# Patient Record
Sex: MALE | Race: BLACK OR AFRICAN AMERICAN | NOT HISPANIC OR LATINO | URBAN - METROPOLITAN AREA
[De-identification: names, ages, dates, MRNs, and addresses within clinical notes are randomized per-mention and may not be internally consistent; named-entity substitution may affect disease eponyms.]

---

## 2017-05-28 ENCOUNTER — EMERGENCY (EMERGENCY)
Facility: HOSPITAL | Age: 77
LOS: 1 days | Discharge: HOME | End: 2017-05-28
Attending: EMERGENCY MEDICINE | Admitting: EMERGENCY MEDICINE
Payer: MEDICARE

## 2017-05-28 VITALS
RESPIRATION RATE: 20 BRPM | DIASTOLIC BLOOD PRESSURE: 69 MMHG | WEIGHT: 175.05 LBS | TEMPERATURE: 98 F | HEIGHT: 68 IN | HEART RATE: 104 BPM | SYSTOLIC BLOOD PRESSURE: 143 MMHG | OXYGEN SATURATION: 98 %

## 2017-05-28 VITALS
OXYGEN SATURATION: 100 % | HEART RATE: 95 BPM | RESPIRATION RATE: 18 BRPM | DIASTOLIC BLOOD PRESSURE: 73 MMHG | SYSTOLIC BLOOD PRESSURE: 134 MMHG

## 2017-05-28 DIAGNOSIS — Z96.649 PRESENCE OF UNSPECIFIED ARTIFICIAL HIP JOINT: Chronic | ICD-10-CM

## 2017-05-28 LAB
ALBUMIN SERPL ELPH-MCNC: 3.9 G/DL — SIGNIFICANT CHANGE UP (ref 3.3–5)
ALP SERPL-CCNC: 98 U/L — SIGNIFICANT CHANGE UP (ref 40–120)
ALT FLD-CCNC: 23 U/L — SIGNIFICANT CHANGE UP (ref 4–41)
APPEARANCE UR: CLEAR — SIGNIFICANT CHANGE UP
AST SERPL-CCNC: 83 U/L — HIGH (ref 4–40)
BASOPHILS # BLD AUTO: 0.02 K/UL — SIGNIFICANT CHANGE UP (ref 0–0.2)
BASOPHILS NFR BLD AUTO: 0.3 % — SIGNIFICANT CHANGE UP (ref 0–2)
BILIRUB SERPL-MCNC: 0.4 MG/DL — SIGNIFICANT CHANGE UP (ref 0.2–1.2)
BILIRUB UR-MCNC: NEGATIVE — SIGNIFICANT CHANGE UP
BLOOD UR QL VISUAL: NEGATIVE — SIGNIFICANT CHANGE UP
BUN SERPL-MCNC: 19 MG/DL — SIGNIFICANT CHANGE UP (ref 7–23)
CALCIUM SERPL-MCNC: 9.1 MG/DL — SIGNIFICANT CHANGE UP (ref 8.4–10.5)
CHLORIDE SERPL-SCNC: 97 MMOL/L — LOW (ref 98–107)
CO2 SERPL-SCNC: 24 MMOL/L — SIGNIFICANT CHANGE UP (ref 22–31)
COLOR SPEC: YELLOW — SIGNIFICANT CHANGE UP
CREAT SERPL-MCNC: 0.87 MG/DL — SIGNIFICANT CHANGE UP (ref 0.5–1.3)
EOSINOPHIL # BLD AUTO: 0.02 K/UL — SIGNIFICANT CHANGE UP (ref 0–0.5)
EOSINOPHIL NFR BLD AUTO: 0.3 % — SIGNIFICANT CHANGE UP (ref 0–6)
GLUCOSE SERPL-MCNC: 199 MG/DL — HIGH (ref 70–99)
GLUCOSE UR-MCNC: NEGATIVE — SIGNIFICANT CHANGE UP
HCT VFR BLD CALC: 35.6 % — LOW (ref 39–50)
HGB BLD-MCNC: 11.9 G/DL — LOW (ref 13–17)
IMM GRANULOCYTES NFR BLD AUTO: 0.3 % — SIGNIFICANT CHANGE UP (ref 0–1.5)
KETONES UR-MCNC: NEGATIVE — SIGNIFICANT CHANGE UP
LEUKOCYTE ESTERASE UR-ACNC: NEGATIVE — SIGNIFICANT CHANGE UP
LYMPHOCYTES # BLD AUTO: 1.18 K/UL — SIGNIFICANT CHANGE UP (ref 1–3.3)
LYMPHOCYTES # BLD AUTO: 16.9 % — SIGNIFICANT CHANGE UP (ref 13–44)
MCHC RBC-ENTMCNC: 30 PG — SIGNIFICANT CHANGE UP (ref 27–34)
MCHC RBC-ENTMCNC: 33.4 % — SIGNIFICANT CHANGE UP (ref 32–36)
MCV RBC AUTO: 89.7 FL — SIGNIFICANT CHANGE UP (ref 80–100)
MONOCYTES # BLD AUTO: 0.42 K/UL — SIGNIFICANT CHANGE UP (ref 0–0.9)
MONOCYTES NFR BLD AUTO: 6 % — SIGNIFICANT CHANGE UP (ref 2–14)
NEUTROPHILS # BLD AUTO: 5.32 K/UL — SIGNIFICANT CHANGE UP (ref 1.8–7.4)
NEUTROPHILS NFR BLD AUTO: 76.2 % — SIGNIFICANT CHANGE UP (ref 43–77)
NITRITE UR-MCNC: NEGATIVE — SIGNIFICANT CHANGE UP
PH UR: 6 — SIGNIFICANT CHANGE UP (ref 4.6–8)
PLATELET # BLD AUTO: 202 K/UL — SIGNIFICANT CHANGE UP (ref 150–400)
PMV BLD: 11 FL — SIGNIFICANT CHANGE UP (ref 7–13)
POTASSIUM SERPL-MCNC: 5.1 MMOL/L — SIGNIFICANT CHANGE UP (ref 3.5–5.3)
POTASSIUM SERPL-MCNC: SIGNIFICANT CHANGE UP MMOL/L (ref 3.5–5.3)
POTASSIUM SERPL-SCNC: 5.1 MMOL/L — SIGNIFICANT CHANGE UP (ref 3.5–5.3)
POTASSIUM SERPL-SCNC: SIGNIFICANT CHANGE UP MMOL/L (ref 3.5–5.3)
PROT SERPL-MCNC: 8.2 G/DL — SIGNIFICANT CHANGE UP (ref 6–8.3)
PROT UR-MCNC: 10 — SIGNIFICANT CHANGE UP
RBC # BLD: 3.97 M/UL — LOW (ref 4.2–5.8)
RBC # FLD: 13 % — SIGNIFICANT CHANGE UP (ref 10.3–14.5)
RBC CASTS # UR COMP ASSIST: SIGNIFICANT CHANGE UP (ref 0–?)
SODIUM SERPL-SCNC: 134 MMOL/L — LOW (ref 135–145)
SP GR SPEC: 1.02 — SIGNIFICANT CHANGE UP (ref 1–1.03)
UROBILINOGEN FLD QL: NORMAL E.U. — SIGNIFICANT CHANGE UP (ref 0.1–0.2)
WBC # BLD: 6.98 K/UL — SIGNIFICANT CHANGE UP (ref 3.8–10.5)
WBC # FLD AUTO: 6.98 K/UL — SIGNIFICANT CHANGE UP (ref 3.8–10.5)
WBC UR QL: SIGNIFICANT CHANGE UP (ref 0–?)

## 2017-05-28 PROCEDURE — 99285 EMERGENCY DEPT VISIT HI MDM: CPT | Mod: 25,GC

## 2017-05-28 PROCEDURE — 72192 CT PELVIS W/O DYE: CPT | Mod: 26

## 2017-05-28 PROCEDURE — 73502 X-RAY EXAM HIP UNI 2-3 VIEWS: CPT | Mod: 26,LT

## 2017-05-28 PROCEDURE — 73700 CT LOWER EXTREMITY W/O DYE: CPT | Mod: 26,LT

## 2017-05-28 PROCEDURE — 71010: CPT | Mod: 26

## 2017-05-28 RX ORDER — OXYCODONE HYDROCHLORIDE 5 MG/1
1 TABLET ORAL
Qty: 12 | Refills: 0 | OUTPATIENT
Start: 2017-05-28 | End: 2017-05-31

## 2017-05-28 RX ORDER — TETANUS TOXOID, REDUCED DIPHTHERIA TOXOID AND ACELLULAR PERTUSSIS VACCINE, ADSORBED 5; 2.5; 8; 8; 2.5 [IU]/.5ML; [IU]/.5ML; UG/.5ML; UG/.5ML; UG/.5ML
0.5 SUSPENSION INTRAMUSCULAR ONCE
Qty: 0 | Refills: 0 | Status: COMPLETED | OUTPATIENT
Start: 2017-05-28 | End: 2017-05-28

## 2017-05-28 RX ADMIN — TETANUS TOXOID, REDUCED DIPHTHERIA TOXOID AND ACELLULAR PERTUSSIS VACCINE, ADSORBED 0.5 MILLILITER(S): 5; 2.5; 8; 8; 2.5 SUSPENSION INTRAMUSCULAR at 07:41

## 2017-05-28 NOTE — ED PROVIDER NOTE - CARDIAC, MLM
Normal rate, regular rhythm.  Heart sounds S1, S2.  No murmurs, rubs or gallops.  Mild L chest wall ttp

## 2017-05-28 NOTE — ED ADULT NURSE NOTE - OBJECTIVE STATEMENT
patient arrives to room 23 a*ox3 s/p mechanical fall five hours ago. patient states he slipped on wet grass, and landed on left side of body, was ambulatory immediately after fall,  pt states he then got in the car and traveled from [place] to [place], upon arrival patient had some difficulty exiting the car and difficulty ambulating 2/2 left groin pain. small abrasion noted to left side of face. denies any anticoagulant use, denies head trauma, denies any chest pain or shortness of breath precipitating fall, patient able to move all extremeties without difficulty, no deformities noted. +csm +rom to BLE and BUE. nad noted. patient appears comfortable, in no acute distress. respirations even and unlabored. awaits md fletcher. will continue to monitor patient closely for remainder of stay in emergency dept.

## 2017-05-28 NOTE — ED PROVIDER NOTE - OBJECTIVE STATEMENT
76M p/w fall earlier today, mechanical fall, slipped on grass.  Landed on L hip on the grass, L chest and L face on the concrete.  Fall was in [place], he was able to walk to the car then drove here.  Pt took oxycodone at home due to the pain, started having trouble walking and especially getting out of the car and climbing stairs was particularly difficult.  Pain is mostly in the L groin.  No LOC, no vomiting.  No visual trouble, able to look L, R without pain or double vision.  No neck pain.  USOH today prior to event.  H/o L hip IM nail 6 mos ago, had R hip surgery 7 ya.

## 2017-05-28 NOTE — ED PROVIDER NOTE - ENMT, MLM
Airway patent, Nasal mucosa clear. Mouth with normal mucosa. Throat has no vesicles, no oropharyngeal exudates and uvula is midline.  L face abrasion.  Zygoma nontender, no deformity.  Orbit nontender.

## 2017-05-28 NOTE — ED ADULT TRIAGE NOTE - CHIEF COMPLAINT QUOTE
pt sustained a mechanical fall in [place] about 5 hours ago. C/o left hip pain and left chest wall pain. has abrasion to left orbit. No LOC. has been ambulatory since.

## 2017-05-28 NOTE — ED PROVIDER NOTE - MEDICAL DECISION MAKING DETAILS
76M p/w [facility] fall, landing on L hip, L chest, L face.  No sign or sx of intracranial injury, NEXUS negative.  Will check labs, xrays, pt already took oxycodone prior to coming.  Trial of ambulation.  If having trouble, will check CT pelvis due to prognostication and PT needs.

## 2017-05-28 NOTE — PROVIDER CONTACT NOTE (OTHER) - ASSESSMENT
Pt is a 76-year-old male, who was referred to ED CM regarding hospital bed, Spoke to pt, wife and her dtr at the bedside, process explained. Recommended to contact PMD to order the bed and complete the medical necessity letter for Semi-Electric hospital bed.

## 2017-05-28 NOTE — ED PROVIDER NOTE - MUSCULOSKELETAL, MLM
Spine appears normal, range of motion is not limited, no muscle or joint tenderness.  Mild L groin ttp.  Full ROM L hip.  No ttp L hip.

## 2017-05-28 NOTE — CONSULT NOTE ADULT - SUBJECTIVE AND OBJECTIVE BOX
Orthopaedic Surgery Consult Note    Patient is a 76y old  Male who presents with a chief complaint of L hip pain s/p femur IMN in 2016 with Dr. Guerrero. Patient was doing well until mech fall onto L hip yesterday. Noted mild hip/groin pain and some difficulty bearing weight on affected side afterward. No difficulty ranging the affected hip. Denies headstrike/LOC. No numbness tingling. No other bone/joint complaints.      PAST MEDICAL & SURGICAL HISTORY:  Hypertension  Hypercholesterolemia  Diabetes Mellitus  Diabetes Mellitus  History of Hypertension  Diabetes    [] No significant past history as reviewed with the patient and family    FAMILY HISTORY:  No pertinent family history in first degree relatives    [] Family history not pertinent as reviewed with the patient and family    SOCIAL HISTORY:    MEDICATIONS  (STANDING):  diphtheria/tetanus/pertussis (acellular) Vaccine (ADAcel) 0.5milliLiter(s) IntraMuscular once    MEDICATIONS  (PRN):    Allergies    No Known Allergies    Intolerances        REVIEW OF SYSTEMS  [x] All review of systems negative except for those marked.  Systemic:	[] Fever		[] Chills		[] Night sweats		[] Fatigue	[] Other  [] Cardiovascular:  [] Pulmonary:  [] Renal/Urologic:  [] Gastrointestinal:  [] Metabolic:  [] Neurologic:  [] Hematologic:  [] ENT:  [] Ophthalmologic:  [] Musculoskeletal: see HPI    Vital Signs Last 24 Hrs  T(C): 36.7, Max: 36.7 ( @ 04:26)  T(F): 98, Max: 98 ( @ 04:26)  HR: 97 (97 - 104)  BP: 158/86 (143/69 - 175/67)  BP(mean): --  RR: 16 (16 - 20)  SpO2: 99% (98% - 99%)      PHYSICAL EXAM:  NAD  LLE: skin intact, nttp  Painless FROM  motor intact GS/TA/EHL  SILT s/s/sp/dp  wwp                          11.9   6.98  )-----------( 202      ( 28 May 2017 05:20 )             35.6         134<L>  |  97<L>  |  19  ----------------------------<  199<H>  Test not performed SPECIMEN GROSSLY HEMOLYZED   |  24  |  0.87    Ca    9.1      28 May 2017 05:20    TPro  8.2  /  Alb  3.9  /  TBili  0.4  /  DBili  x   /  AST  83<H>  /  ALT  23  /  AlkPhos  98  05-28      Urinalysis Basic - ( 28 May 2017 05:35 )    Color: YELLOW / Appearance: CLEAR / S.016 / pH: 6.0  Gluc: NEGATIVE / Ketone: NEGATIVE  / Bili: NEGATIVE / Urobili: NORMAL E.U.   Blood: NEGATIVE / Protein: 10 / Nitrite: NEGATIVE   Leuk Esterase: NEGATIVE / RBC: 0-2 / WBC 0-2   Sq Epi: x / Non Sq Epi: x / Bacteria: x        IMAGING STUDIES:  XR L hip: IMN in place, no e/o fx or loosening      76y Male w/ L hip pain s/p mech fall onto left hip s/p femur IMN in 2016  - pain control  - FU CT L hip  - Orthopaedic Surgery Consult Note    Patient is a 76y old  Male who presents with a chief complaint of L hip pain s/p femur IMN in 2016 with Dr. Guerrero. Patient was doing well until mech fall onto L hip yesterday. Noted mild hip/groin pain and some difficulty bearing weight on affected side afterward. No difficulty ranging the affected hip. Denies headstrike/LOC. No numbness tingling. No other bone/joint complaints.      PAST MEDICAL & SURGICAL HISTORY:  Hypertension  Hypercholesterolemia  Diabetes Mellitus  Diabetes Mellitus  History of Hypertension  Diabetes    [] No significant past history as reviewed with the patient and family    FAMILY HISTORY:  No pertinent family history in first degree relatives    [] Family history not pertinent as reviewed with the patient and family    SOCIAL HISTORY:    MEDICATIONS  (STANDING):  diphtheria/tetanus/pertussis (acellular) Vaccine (ADAcel) 0.5milliLiter(s) IntraMuscular once    MEDICATIONS  (PRN):    Allergies    No Known Allergies    Intolerances        REVIEW OF SYSTEMS  [x] All review of systems negative except for those marked.  Systemic:	[] Fever		[] Chills		[] Night sweats		[] Fatigue	[] Other  [] Cardiovascular:  [] Pulmonary:  [] Renal/Urologic:  [] Gastrointestinal:  [] Metabolic:  [] Neurologic:  [] Hematologic:  [] ENT:  [] Ophthalmologic:  [] Musculoskeletal: see HPI    Vital Signs Last 24 Hrs  T(C): 36.7, Max: 36.7 ( @ 04:26)  T(F): 98, Max: 98 ( @ 04:26)  HR: 97 (97 - 104)  BP: 158/86 (143/69 - 175/67)  BP(mean): --  RR: 16 (16 - 20)  SpO2: 99% (98% - 99%)      PHYSICAL EXAM:  NAD  LLE: skin intact, nttp  Painless FROM  motor intact GS/TA/EHL  SILT s/s/sp/dp  wwp                          11.9   6.98  )-----------( 202      ( 28 May 2017 05:20 )             35.6         134<L>  |  97<L>  |  19  ----------------------------<  199<H>  Test not performed SPECIMEN GROSSLY HEMOLYZED   |  24  |  0.87    Ca    9.1      28 May 2017 05:20    TPro  8.2  /  Alb  3.9  /  TBili  0.4  /  DBili  x   /  AST  83<H>  /  ALT  23  /  AlkPhos  98  05-28      Urinalysis Basic - ( 28 May 2017 05:35 )    Color: YELLOW / Appearance: CLEAR / S.016 / pH: 6.0  Gluc: NEGATIVE / Ketone: NEGATIVE  / Bili: NEGATIVE / Urobili: NORMAL E.U.   Blood: NEGATIVE / Protein: 10 / Nitrite: NEGATIVE   Leuk Esterase: NEGATIVE / RBC: 0-2 / WBC 0-2   Sq Epi: x / Non Sq Epi: x / Bacteria: x        IMAGING STUDIES:  XR L hip: IMN in place, no e/o fx or loosening      76y Male w/ L hip pain s/p mech fall onto left hip s/p femur IMN in 2016  - pain control  - FU CT L hip official read  - WBAT with assistive devices as need  - FU with Dr. Guerrero as scheduled  - No acute orthopedic intervention at this time

## 2017-05-28 NOTE — PROVIDER CONTACT NOTE (OTHER) - ACTION/TREATMENT ORDERED:
Sample of a medical necessity letter for Semi-Electric hospital bed and list of  DME companies to supply the bed provided.

## 2017-05-28 NOTE — ED PROVIDER NOTE - PROGRESS NOTE DETAILS
Spoke with Nirali, , [phone] as family feels they can care for patient at home but had questions about services for home care or transportation. Awaiting for CT read. DL PGY4 Melly PGY3: Pt seen by CM, stable for d/c home, rx for oxy sent to pharmacy, ready for d/c. d/c instructions provided by Dr. Liriano

## 2022-09-07 ENCOUNTER — EMERGENCY (EMERGENCY)
Facility: HOSPITAL | Age: 82
LOS: 1 days | Discharge: HOME | End: 2022-09-07
Attending: EMERGENCY MEDICINE | Admitting: EMERGENCY MEDICINE

## 2022-09-07 VITALS
DIASTOLIC BLOOD PRESSURE: 77 MMHG | RESPIRATION RATE: 18 BRPM | SYSTOLIC BLOOD PRESSURE: 181 MMHG | OXYGEN SATURATION: 100 % | TEMPERATURE: 98 F | HEART RATE: 94 BPM | HEIGHT: 68 IN

## 2022-09-07 DIAGNOSIS — Z96.649 PRESENCE OF UNSPECIFIED ARTIFICIAL HIP JOINT: Chronic | ICD-10-CM

## 2022-09-07 PROCEDURE — 99284 EMERGENCY DEPT VISIT MOD MDM: CPT | Mod: FS

## 2022-09-07 RX ORDER — KETOROLAC TROMETHAMINE 30 MG/ML
15 SYRINGE (ML) INJECTION ONCE
Refills: 0 | Status: DISCONTINUED | OUTPATIENT
Start: 2022-09-07 | End: 2022-09-07

## 2022-09-07 RX ORDER — CYCLOBENZAPRINE HYDROCHLORIDE 10 MG/1
1 TABLET, FILM COATED ORAL
Qty: 15 | Refills: 0
Start: 2022-09-07 | End: 2022-09-11

## 2022-09-07 RX ORDER — LIDOCAINE 4 G/100G
1 CREAM TOPICAL
Qty: 5 | Refills: 0
Start: 2022-09-07 | End: 2022-09-11

## 2022-09-07 RX ORDER — IBUPROFEN 200 MG
1 TABLET ORAL
Qty: 15 | Refills: 0
Start: 2022-09-07 | End: 2022-09-11

## 2022-09-07 RX ORDER — CYCLOBENZAPRINE HYDROCHLORIDE 10 MG/1
10 TABLET, FILM COATED ORAL ONCE
Refills: 0 | Status: COMPLETED | OUTPATIENT
Start: 2022-09-07 | End: 2022-09-07

## 2022-09-07 RX ORDER — LIDOCAINE 4 G/100G
1 CREAM TOPICAL ONCE
Refills: 0 | Status: COMPLETED | OUTPATIENT
Start: 2022-09-07 | End: 2022-09-07

## 2022-09-07 RX ADMIN — Medication 15 MILLIGRAM(S): at 15:42

## 2022-09-07 RX ADMIN — CYCLOBENZAPRINE HYDROCHLORIDE 10 MILLIGRAM(S): 10 TABLET, FILM COATED ORAL at 15:43

## 2022-09-07 RX ADMIN — LIDOCAINE 1 PATCH: 4 CREAM TOPICAL at 15:43

## 2022-09-07 RX ADMIN — LIDOCAINE 1 PATCH: 4 CREAM TOPICAL at 15:50

## 2022-09-07 NOTE — ED PROVIDER NOTE - PHYSICAL EXAMINATION
CONSTITUTIONAL: Well-appearing; well-nourished; in no apparent distress. Non-toxic appearing.   NEURO: Alert & oriented. Gait steady without assistance. Sensory and motor functions are grossly intact.  PSYCH: Mood appropriate. Thought processes intact.   NECK: Supple  CARD: Regular rate and rhythm, no murmurs  RESP: No accessory muscle use; breath sounds clear and equal bilaterally; no wheezes, rhonchi, or rales     ABD: Soft; non-distended; non-tender.   MUSCULOSKELETAL/EXTREMITIES: FROM in all four extremities; no extremity edema. tenderness on right paraspinal muscle on L4-5. no deformity/step off/ecchymosis. negative SLR on both sides.   SKIN: Warm; dry; no apparent lesions or exudate

## 2022-09-07 NOTE — ED ADULT NURSE NOTE - OBJECTIVE STATEMENT
Patient is an 82 yo male, h/x HTN, HLD, DM2, presenting with R lower back/hip/leg pain x 2 days. AAOx4, no signs of distress, reports he has had pain in the R hip for a long time but it has been worse over the past 2 days. Taking tylenol at home for pain. Able to walk with cane. Denies chest pain, shortness of breath. Medicated for pain. Fall precautions maintained.

## 2022-09-07 NOTE — ED PROVIDER NOTE - NSICDXPASTMEDICALHX_GEN_ALL_CORE_FT
PAST MEDICAL HISTORY:  Diabetes Mellitus     Diabetes Mellitus     DM (diabetes mellitus)     High cholesterol     Hypercholesterolemia     Hypertension     Hypertension

## 2022-09-07 NOTE — ED PROVIDER NOTE - ATTENDING CONTRIBUTION TO CARE
Brief HPI:  80 yo M with B/L hip replacement, DM, HTN, HLD, c/o right back pain radiating down his leg since last night.  Patient states pain is in right si joint area, radiating to posterior leg and foot, constant, worse with movement, no alleviating factors.  Patient reports similar episode years ago with resolution.  Denies trauma, numbness, tingling, weakness, dysuria, hematuria, fever, chills, nausea, vomiting, abdominal pain bowel or bladder incontinence or retention.      Vitals:   Reviewed    Exam:    GEN:  Non-toxic appearing, non-distressed, speaking full sentences, non-diaphoretic, AAOx3  HEENT:  NCAT, neck supple, EOMI, PERRLA, sclera anicteric, no conjunctival pallor or injection, no stridor, normal voice, no tonsillar exudate, uvula midline  CV:  regular rhythm and rate, s1/s2 audible, no murmurs, rubs or gallops, peripheral pulses 2+ and symmetric  PULM:  non-labored respirations, lungs clear to auscultation bilaterally, no wheezes, crackles or rales  ABD:  non distended, non-tender, no rebound, no guarding, negative Sumner's sign, bowel sounds normal, no cvat  MSK:  no gross deformity, non-tender extremities and joints, range of motion grossly normal appearing, no extremity edema, extremities warm and well perfused   NEURO:  AAOx3, CN II-XII intact, motor 5/5 in upper and lower extremities bilaterally, sensation grossly intact in extremities and trunk, finger to nose testing wnl, no nystagmus, negative Romberg, no pronator drift, no gait deficit  SKIN:  warm, dry, no rash or vesicles   SPINE:  no midline c/t/l spine tenderness.     A/P:  80 yo M with B/L hip replacement, DM, HTN, HLD, c/o right back pain radiating to rle starting one day ago, atraumatic.  VSS.  Low concern for spinal epidural abscess, transverse myelitis, acute cord compression, or cauda equina syndrome at this time.  The patient possesses no red flags including fever, chills, history of intravenous drug use, recent spinal instrumentation, predominant nocturnal pain, history of immunosuppression, pelvic or perineal anesthesia or paresthesia, or fecal or urinary incontinence or retention.  Exam not c/w renal colic or abdominal infection.  Suspect sciatica.  Will provide analgesia, referral to outpatient orthopedics.  Return precautions given.  Anticipate discharge.

## 2022-09-07 NOTE — ED PROVIDER NOTE - PATIENT PORTAL LINK FT
You can access the FollowMyHealth Patient Portal offered by [facility] by registering at the following website: [email or web address]. By joining [facility]’s FollowMyHealth portal, you will also be able to view your health information using other applications (apps) compatible with our system.

## 2022-09-07 NOTE — ED PROVIDER NOTE - OBJECTIVE STATEMENT
82 yo M with B/L hip replacement, DM, HTN, HLD, c/o right back pain radiating down his leg since last night. denies heavy lifting, direct trauma, fall. pt is still able to walk but with pain. denies numbness/tingling down his legs. denies weakness in legs. normal urination with no retention or incontinence. denies fever, NVD, dysuria, hematuria, abd pain, chest pain, sob.   pt walks with a cane on baseline.

## 2022-09-07 NOTE — ED PROVIDER NOTE - NSICDXPASTSURGICALHX_GEN_ALL_CORE_FT
PAST SURGICAL HISTORY:  Diabetes     History of hip replacement R-hip  (2010)    History of Hypertension

## 2022-09-07 NOTE — ED ADULT TRIAGE NOTE - CHIEF COMPLAINT QUOTE
c/o right hip pain that radiates down the leg onset 1 month ago progressively worse, reports unable to sleep last night due to pain, denies fall or injury, hx of hip replacement, DM, HTN

## 2022-09-07 NOTE — ED PROVIDER NOTE - PROGRESS NOTE DETAILS
pt reports improvement after treatment. will send home with flexiril, ibuprofen, lido patch, and PT f/u. home exercise recommended.

## 2022-09-07 NOTE — ED PROVIDER NOTE - CLINICAL SUMMARY MEDICAL DECISION MAKING FREE TEXT BOX
80 yo M here for right sided back pain radiating down his leg for 1 day. exam found tenderness on right paraspinal muscle at L4-5.  no signs of trauma. pt is ambulatory with a cane, at baseline.   most likely sciatica.   no concern for cauda equina/cord compression at this time as pain is atraumatic, and pt is ambulatory with no sensation/motor loss. no red flags.   no concern for renal colic as pain is reproducible and pt pain is worse with movement, and pt has no  complaints.   treat with lido patch, toradol, cyclobenzaprine.   pt reports improvement after treatment. will send home with flexiril, ibuprofen, lido patch, and PT f/u. home exercise recommended.

## 2022-10-15 ENCOUNTER — EMERGENCY (EMERGENCY)
Facility: HOSPITAL | Age: 82
LOS: 1 days | Discharge: HOME | End: 2022-10-15
Attending: EMERGENCY MEDICINE | Admitting: EMERGENCY MEDICINE

## 2022-10-15 VITALS
OXYGEN SATURATION: 100 % | TEMPERATURE: 98 F | HEIGHT: 68 IN | DIASTOLIC BLOOD PRESSURE: 88 MMHG | HEART RATE: 90 BPM | SYSTOLIC BLOOD PRESSURE: 159 MMHG | RESPIRATION RATE: 16 BRPM

## 2022-10-15 DIAGNOSIS — Z96.649 PRESENCE OF UNSPECIFIED ARTIFICIAL HIP JOINT: Chronic | ICD-10-CM

## 2022-10-15 PROCEDURE — 99284 EMERGENCY DEPT VISIT MOD MDM: CPT

## 2022-10-15 RX ORDER — MORPHINE SULFATE 50 MG/1
15 CAPSULE, EXTENDED RELEASE ORAL ONCE
Refills: 0 | Status: DISCONTINUED | OUTPATIENT
Start: 2022-10-15 | End: 2022-10-15

## 2022-10-15 RX ORDER — MORPHINE SULFATE 50 MG/1
1 CAPSULE, EXTENDED RELEASE ORAL
Qty: 8 | Refills: 0
Start: 2022-10-15 | End: 2022-10-16

## 2022-10-15 RX ADMIN — MORPHINE SULFATE 15 MILLIGRAM(S): 50 CAPSULE, EXTENDED RELEASE ORAL at 15:54

## 2022-10-15 NOTE — ED PROVIDER NOTE - PATIENT PORTAL LINK FT
You can access the FollowMyHealth Patient Portal offered by [facility] by registering at the following website: [email or web address]. By joining [facility]’s FollowMyHealth portal, you will also be able to view your health information using other applications (apps) compatible with our system.

## 2022-10-15 NOTE — ED PROVIDER NOTE - OBJECTIVE STATEMENT
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  Patient states similar pain to previous episodes.  Steady gait no saddle anesthesia well-appearing takes gabapentin 300 3 times daily with exacerbation today after sitting on a chair for 6 hours straight.  Patient denies any urinary or fecal incontinence.  Patient states pain is approximately 6 out of 10 sharp localizing mildly radiating towards her right lower extremity.

## 2022-10-15 NOTE — ED PROVIDER NOTE - MUSCULOSKELETAL, MLM
Spine appears normal, range of motion is not limited, no muscle or joint tenderness. +FROM b/l lower extremities . normal cap refill, pulses, ambulatory at baseline visualized her ein ed. no swelling , signs of dvt. induration, ttp over mid thigh.

## 2022-10-15 NOTE — ED PROVIDER NOTE - CLINICAL SUMMARY MEDICAL DECISION MAKING FREE TEXT BOX
Patient presents with acute exacerbation of chronic sciatica pain steady gait baseline uses a cane with recent MRI and x-rays showing sciatica.  Patient already is set up with physical therapy see physical therapy twice a week.  Patient states gabapentin works but has not been enough for the last 2 days.  After sitting for a long time yesterday.  Patient started on morphine and instructed take meds with as needed as needed and careful with operating any heavy machinery or ambulation.  Patient otherwise no red flags has follow-up with primary care doctor and physical therapy coming up this week.  Patient otherwise and wife at bedside understands plan and agrees with plan.

## 2022-10-15 NOTE — ED PROVIDER NOTE - MUSCULOSKELETAL NEGATIVE STATEMENT, MLM
no back pain, no gout, +r LE pain consistent with past musculoskeletal pain, no neck pain, and no weakness.

## 2022-10-15 NOTE — ED ADULT NURSE NOTE - OBJECTIVE STATEMENT
pt ambulatory from home; constant pain to right hamstring to lower back region. pt on stretcher, stretching his hamstring. pt deneis parasthesias. ambulates with steady gait but has pain with shifting positions.

## 2022-10-15 NOTE — ED ADULT TRIAGE NOTE - CHIEF COMPLAINT QUOTE
r leg pains increasing since last pm/ seen  by pt on thursday for same complaint. increased pain today radiating r hip,buttock to r leg

## 2025-04-03 NOTE — ED ADULT NURSE NOTE - HISTORY OF COVID-19 VACCINATION
Called pt to let him know what our team [facility]. Our prior auth dept states they need to go through Medical records for this info. Did give pt number for Medical records if any questions   Pt understood    Yes